# Patient Record
Sex: MALE | Race: OTHER | NOT HISPANIC OR LATINO | URBAN - METROPOLITAN AREA
[De-identification: names, ages, dates, MRNs, and addresses within clinical notes are randomized per-mention and may not be internally consistent; named-entity substitution may affect disease eponyms.]

---

## 2017-02-03 ENCOUNTER — FOLLOW UP (OUTPATIENT)
Dept: URBAN - METROPOLITAN AREA CLINIC 35 | Facility: CLINIC | Age: 47
End: 2017-02-03

## 2017-02-03 DIAGNOSIS — H35.713: ICD-10-CM

## 2017-02-03 DIAGNOSIS — H35.3210: ICD-10-CM

## 2017-02-03 PROCEDURE — 67028 INJECTION EYE DRUG: CPT

## 2017-02-03 PROCEDURE — 92012 INTRM OPH EXAM EST PATIENT: CPT | Mod: 25

## 2017-02-03 PROCEDURE — 92134 CPTRZ OPH DX IMG PST SGM RTA: CPT

## 2017-02-03 ASSESSMENT — VISUAL ACUITY
OD_CC: 20/40-1
OS_CC: 20/50+2

## 2017-02-03 ASSESSMENT — TONOMETRY
OS_IOP_MMHG: 17
OD_IOP_MMHG: 12

## 2017-04-07 ENCOUNTER — FOLLOW UP (OUTPATIENT)
Dept: URBAN - METROPOLITAN AREA CLINIC 35 | Facility: CLINIC | Age: 47
End: 2017-04-07

## 2017-04-07 DIAGNOSIS — H35.713: ICD-10-CM

## 2017-04-07 DIAGNOSIS — H35.3210: ICD-10-CM

## 2017-04-07 PROCEDURE — 67028 INJECTION EYE DRUG: CPT

## 2017-04-07 PROCEDURE — 92134 CPTRZ OPH DX IMG PST SGM RTA: CPT

## 2017-04-07 PROCEDURE — 92012 INTRM OPH EXAM EST PATIENT: CPT | Mod: 25

## 2017-04-07 ASSESSMENT — VISUAL ACUITY
OD_CC: 20/40-2
OS_CC: 20/40-2

## 2017-04-07 ASSESSMENT — TONOMETRY
OD_IOP_MMHG: 9,10
OS_IOP_MMHG: 10

## 2017-05-12 ENCOUNTER — FOLLOW UP (OUTPATIENT)
Dept: URBAN - METROPOLITAN AREA CLINIC 35 | Facility: CLINIC | Age: 47
End: 2017-05-12

## 2017-05-12 DIAGNOSIS — H35.713: ICD-10-CM

## 2017-05-12 DIAGNOSIS — H35.3210: ICD-10-CM

## 2017-05-12 PROCEDURE — 4040F PNEUMOC VAC/ADMIN/RCVD: CPT | Mod: 8P

## 2017-05-12 PROCEDURE — G8428 CUR MEDS NOT DOCUMENT: HCPCS

## 2017-05-12 PROCEDURE — 4177F TALK PT/CRGVR RE AREDS PREV: CPT

## 2017-05-12 PROCEDURE — 92014 COMPRE OPH EXAM EST PT 1/>: CPT | Mod: 25

## 2017-05-12 PROCEDURE — 1036F TOBACCO NON-USER: CPT

## 2017-05-12 PROCEDURE — G8482 FLU IMMUNIZE ORDER/ADMIN: HCPCS

## 2017-05-12 PROCEDURE — 92226 OPHTHALMOSCOPY (SUB): CPT

## 2017-05-12 PROCEDURE — 2019F DILATED MACUL EXAM DONE: CPT

## 2017-05-12 PROCEDURE — 67028 INJECTION EYE DRUG: CPT

## 2017-05-12 PROCEDURE — 92134 CPTRZ OPH DX IMG PST SGM RTA: CPT

## 2017-05-12 ASSESSMENT — VISUAL ACUITY
OD_CC: 20/50
OS_CC: 20/40-2

## 2017-05-12 ASSESSMENT — TONOMETRY
OD_IOP_MMHG: 14
OS_IOP_MMHG: 17

## 2017-07-20 ENCOUNTER — FOLLOW UP (OUTPATIENT)
Dept: URBAN - METROPOLITAN AREA CLINIC 11 | Facility: CLINIC | Age: 47
End: 2017-07-20

## 2017-07-20 DIAGNOSIS — H35.3210: ICD-10-CM

## 2017-07-20 DIAGNOSIS — H35.713: ICD-10-CM

## 2017-07-20 PROCEDURE — 67028 INJECTION EYE DRUG: CPT

## 2017-07-20 PROCEDURE — 92134 CPTRZ OPH DX IMG PST SGM RTA: CPT

## 2017-07-20 PROCEDURE — 92012 INTRM OPH EXAM EST PATIENT: CPT | Mod: 25

## 2017-07-20 ASSESSMENT — VISUAL ACUITY
OD_CC: 20/50+2
OS_CC: 20/30-2

## 2017-07-20 ASSESSMENT — TONOMETRY
OS_IOP_MMHG: 13
OD_IOP_MMHG: 12

## 2017-09-15 ENCOUNTER — FOLLOW UP (OUTPATIENT)
Dept: URBAN - METROPOLITAN AREA CLINIC 35 | Facility: CLINIC | Age: 47
End: 2017-09-15

## 2017-09-15 DIAGNOSIS — H35.3210: ICD-10-CM

## 2017-09-15 DIAGNOSIS — H35.713: ICD-10-CM

## 2017-09-15 PROCEDURE — 67028 INJECTION EYE DRUG: CPT

## 2017-09-15 PROCEDURE — 92012 INTRM OPH EXAM EST PATIENT: CPT | Mod: 25

## 2017-09-15 PROCEDURE — 92134 CPTRZ OPH DX IMG PST SGM RTA: CPT

## 2017-09-15 ASSESSMENT — TONOMETRY
OD_IOP_MMHG: 15
OS_IOP_MMHG: 15

## 2017-09-15 ASSESSMENT — VISUAL ACUITY
OS_CC: 20/50
OD_CC: 20/50+2

## 2017-11-17 ENCOUNTER — FOLLOW UP (OUTPATIENT)
Dept: URBAN - METROPOLITAN AREA CLINIC 35 | Facility: CLINIC | Age: 47
End: 2017-11-17

## 2017-11-17 DIAGNOSIS — H35.3210: ICD-10-CM

## 2017-11-17 DIAGNOSIS — H35.713: ICD-10-CM

## 2017-11-17 PROCEDURE — 92012 INTRM OPH EXAM EST PATIENT: CPT | Mod: 25

## 2017-11-17 PROCEDURE — 67028 INJECTION EYE DRUG: CPT

## 2017-11-17 PROCEDURE — 92134 CPTRZ OPH DX IMG PST SGM RTA: CPT

## 2017-11-17 ASSESSMENT — VISUAL ACUITY
OS_CC: 20/50-1
OD_CC: 20/50+1

## 2017-11-17 ASSESSMENT — TONOMETRY
OD_IOP_MMHG: 16
OS_IOP_MMHG: 16

## 2018-01-19 ENCOUNTER — FOLLOW UP (OUTPATIENT)
Dept: URBAN - METROPOLITAN AREA CLINIC 35 | Facility: CLINIC | Age: 48
End: 2018-01-19

## 2018-01-19 DIAGNOSIS — H35.3210: ICD-10-CM

## 2018-01-19 DIAGNOSIS — H35.713: ICD-10-CM

## 2018-01-19 PROCEDURE — 92134 CPTRZ OPH DX IMG PST SGM RTA: CPT

## 2018-01-19 PROCEDURE — 92012 INTRM OPH EXAM EST PATIENT: CPT

## 2018-01-19 ASSESSMENT — VISUAL ACUITY
OD_CC: 20/50
OS_CC: 20/50+1

## 2018-01-19 ASSESSMENT — TONOMETRY
OS_IOP_MMHG: 16
OD_IOP_MMHG: 13

## 2018-03-30 ENCOUNTER — FOLLOW UP (OUTPATIENT)
Dept: URBAN - METROPOLITAN AREA CLINIC 35 | Facility: CLINIC | Age: 48
End: 2018-03-30

## 2018-03-30 DIAGNOSIS — H35.713: ICD-10-CM

## 2018-03-30 DIAGNOSIS — H35.3210: ICD-10-CM

## 2018-03-30 DIAGNOSIS — H35.712: ICD-10-CM

## 2018-03-30 PROCEDURE — 92226 OPHTHALMOSCOPY (SUB): CPT

## 2018-03-30 PROCEDURE — 92134 CPTRZ OPH DX IMG PST SGM RTA: CPT

## 2018-03-30 PROCEDURE — 92014 COMPRE OPH EXAM EST PT 1/>: CPT

## 2018-03-30 ASSESSMENT — TONOMETRY
OD_IOP_MMHG: 14
OS_IOP_MMHG: 14

## 2018-03-30 ASSESSMENT — VISUAL ACUITY
OD_CC: 20/40-1
OS_CC: 20/40-1

## 2018-05-18 ENCOUNTER — FOLLOW UP (OUTPATIENT)
Dept: URBAN - METROPOLITAN AREA CLINIC 35 | Facility: CLINIC | Age: 48
End: 2018-05-18

## 2018-05-18 DIAGNOSIS — H35.712: ICD-10-CM

## 2018-05-18 DIAGNOSIS — H35.3210: ICD-10-CM

## 2018-05-18 DIAGNOSIS — H35.713: ICD-10-CM

## 2018-05-18 PROCEDURE — 92134 CPTRZ OPH DX IMG PST SGM RTA: CPT

## 2018-05-18 PROCEDURE — 92012 INTRM OPH EXAM EST PATIENT: CPT

## 2018-05-18 ASSESSMENT — TONOMETRY
OD_IOP_MMHG: 14
OS_IOP_MMHG: 16

## 2018-05-18 ASSESSMENT — VISUAL ACUITY
OS_CC: 20/70
OD_CC: 20/40-2

## 2018-08-03 ENCOUNTER — FOLLOW UP (OUTPATIENT)
Dept: URBAN - METROPOLITAN AREA CLINIC 35 | Facility: CLINIC | Age: 48
End: 2018-08-03

## 2018-08-03 DIAGNOSIS — H35.713: ICD-10-CM

## 2018-08-03 DIAGNOSIS — H35.3210: ICD-10-CM

## 2018-08-03 DIAGNOSIS — H35.712: ICD-10-CM

## 2018-08-03 PROCEDURE — 92012 INTRM OPH EXAM EST PATIENT: CPT

## 2018-08-03 PROCEDURE — 92134 CPTRZ OPH DX IMG PST SGM RTA: CPT

## 2018-08-03 ASSESSMENT — VISUAL ACUITY
OS_CC: 20/40
OD_CC: 20/40

## 2018-08-03 ASSESSMENT — TONOMETRY
OD_IOP_MMHG: 13
OS_IOP_MMHG: 13

## 2018-11-02 ENCOUNTER — FOLLOW UP (OUTPATIENT)
Dept: URBAN - METROPOLITAN AREA CLINIC 35 | Facility: CLINIC | Age: 48
End: 2018-11-02

## 2018-11-02 DIAGNOSIS — H35.3210: ICD-10-CM

## 2018-11-02 DIAGNOSIS — H35.712: ICD-10-CM

## 2018-11-02 DIAGNOSIS — H35.713: ICD-10-CM

## 2018-11-02 PROCEDURE — 92226 OPHTHALMOSCOPY (SUB): CPT

## 2018-11-02 PROCEDURE — 92014 COMPRE OPH EXAM EST PT 1/>: CPT

## 2018-11-02 PROCEDURE — 92134 CPTRZ OPH DX IMG PST SGM RTA: CPT

## 2018-11-02 ASSESSMENT — VISUAL ACUITY
OS_CC: 20/50
OD_CC: 20/40

## 2018-11-02 ASSESSMENT — TONOMETRY
OD_IOP_MMHG: 12
OS_IOP_MMHG: 18

## 2019-03-01 ENCOUNTER — FOLLOW UP (OUTPATIENT)
Dept: URBAN - METROPOLITAN AREA CLINIC 35 | Facility: CLINIC | Age: 49
End: 2019-03-01

## 2019-03-01 DIAGNOSIS — H35.3210: ICD-10-CM

## 2019-03-01 DIAGNOSIS — H35.713: ICD-10-CM

## 2019-03-01 DIAGNOSIS — H35.712: ICD-10-CM

## 2019-03-01 PROCEDURE — 92014 COMPRE OPH EXAM EST PT 1/>: CPT

## 2019-03-01 PROCEDURE — 92134 CPTRZ OPH DX IMG PST SGM RTA: CPT

## 2019-03-01 PROCEDURE — 92226 OPHTHALMOSCOPY (SUB): CPT

## 2019-03-01 ASSESSMENT — TONOMETRY
OS_IOP_MMHG: 13
OD_IOP_MMHG: 11

## 2019-03-01 ASSESSMENT — VISUAL ACUITY
OS_CC: 20/50
OD_CC: 20/50+1

## 2019-04-12 ENCOUNTER — FOLLOW UP (OUTPATIENT)
Dept: URBAN - METROPOLITAN AREA CLINIC 35 | Facility: CLINIC | Age: 49
End: 2019-04-12

## 2019-04-12 DIAGNOSIS — H35.713: ICD-10-CM

## 2019-04-12 DIAGNOSIS — H35.712: ICD-10-CM

## 2019-04-12 DIAGNOSIS — H35.3230: ICD-10-CM

## 2019-04-12 PROCEDURE — 67028 INJECTION EYE DRUG: CPT

## 2019-04-12 PROCEDURE — 92012 INTRM OPH EXAM EST PATIENT: CPT | Mod: 25

## 2019-04-12 PROCEDURE — 92250 FUNDUS PHOTOGRAPHY W/I&R: CPT

## 2019-04-12 PROCEDURE — 92235 FLUORESCEIN ANGRPH MLTIFRAME: CPT

## 2019-04-12 PROCEDURE — 92134 CPTRZ OPH DX IMG PST SGM RTA: CPT

## 2019-04-12 ASSESSMENT — TONOMETRY
OS_IOP_MMHG: 19
OD_IOP_MMHG: 15

## 2019-04-12 ASSESSMENT — VISUAL ACUITY
OS_CC: 20/60
OD_CC: 20/60

## 2019-05-24 ENCOUNTER — FOLLOW UP (OUTPATIENT)
Dept: URBAN - METROPOLITAN AREA CLINIC 35 | Facility: CLINIC | Age: 49
End: 2019-05-24

## 2019-05-24 DIAGNOSIS — H35.713: ICD-10-CM

## 2019-05-24 DIAGNOSIS — H35.3230: ICD-10-CM

## 2019-05-24 DIAGNOSIS — H35.712: ICD-10-CM

## 2019-05-24 PROCEDURE — 92226 OPHTHALMOSCOPY (SUB): CPT

## 2019-05-24 PROCEDURE — 92012 INTRM OPH EXAM EST PATIENT: CPT

## 2019-05-24 PROCEDURE — 92134 CPTRZ OPH DX IMG PST SGM RTA: CPT

## 2019-05-24 ASSESSMENT — VISUAL ACUITY
OS_CC: 20/50+1
OD_CC: 20/50+1

## 2019-05-24 ASSESSMENT — TONOMETRY
OD_IOP_MMHG: 12
OS_IOP_MMHG: 13

## 2019-07-12 ENCOUNTER — FOLLOW UP (OUTPATIENT)
Dept: URBAN - METROPOLITAN AREA CLINIC 35 | Facility: CLINIC | Age: 49
End: 2019-07-12

## 2019-07-12 DIAGNOSIS — H35.712: ICD-10-CM

## 2019-07-12 DIAGNOSIS — H35.713: ICD-10-CM

## 2019-07-12 DIAGNOSIS — H35.3230: ICD-10-CM

## 2019-07-12 PROCEDURE — 92014 COMPRE OPH EXAM EST PT 1/>: CPT

## 2019-07-12 PROCEDURE — 92134 CPTRZ OPH DX IMG PST SGM RTA: CPT

## 2019-07-12 PROCEDURE — 92226 OPHTHALMOSCOPY (SUB): CPT

## 2019-07-12 ASSESSMENT — VISUAL ACUITY
OS_CC: 20/60+1
OD_CC: 20/50

## 2019-07-12 ASSESSMENT — TONOMETRY
OS_IOP_MMHG: 14
OD_IOP_MMHG: 15

## 2019-09-20 ENCOUNTER — FOLLOW UP (OUTPATIENT)
Dept: URBAN - METROPOLITAN AREA CLINIC 35 | Facility: CLINIC | Age: 49
End: 2019-09-20

## 2019-09-20 DIAGNOSIS — H35.712: ICD-10-CM

## 2019-09-20 DIAGNOSIS — H35.713: ICD-10-CM

## 2019-09-20 DIAGNOSIS — H35.3230: ICD-10-CM

## 2019-09-20 PROCEDURE — 92134 CPTRZ OPH DX IMG PST SGM RTA: CPT

## 2019-09-20 PROCEDURE — 92226 OPHTHALMOSCOPY (SUB): CPT

## 2019-09-20 PROCEDURE — 92014 COMPRE OPH EXAM EST PT 1/>: CPT

## 2019-09-20 ASSESSMENT — VISUAL ACUITY
OS_CC: 20/50+1
OD_CC: 20/60-1

## 2019-09-20 ASSESSMENT — TONOMETRY
OS_IOP_MMHG: 17
OD_IOP_MMHG: 16

## 2019-12-20 ENCOUNTER — FOLLOW UP (OUTPATIENT)
Dept: URBAN - METROPOLITAN AREA CLINIC 35 | Facility: CLINIC | Age: 49
End: 2019-12-20

## 2019-12-20 DIAGNOSIS — H35.3230: ICD-10-CM

## 2019-12-20 DIAGNOSIS — H35.712: ICD-10-CM

## 2019-12-20 DIAGNOSIS — H35.713: ICD-10-CM

## 2019-12-20 PROCEDURE — 92226 OPHTHALMOSCOPY (SUB): CPT

## 2019-12-20 PROCEDURE — 92134 CPTRZ OPH DX IMG PST SGM RTA: CPT

## 2019-12-20 PROCEDURE — 92014 COMPRE OPH EXAM EST PT 1/>: CPT

## 2019-12-20 ASSESSMENT — TONOMETRY
OD_IOP_MMHG: 12
OS_IOP_MMHG: 15

## 2019-12-20 ASSESSMENT — VISUAL ACUITY
OD_CC: 20/50
OS_CC: 20/50+1

## 2020-06-26 ENCOUNTER — FOLLOW UP (OUTPATIENT)
Dept: URBAN - METROPOLITAN AREA CLINIC 35 | Facility: CLINIC | Age: 50
End: 2020-06-26

## 2020-06-26 DIAGNOSIS — H35.712: ICD-10-CM

## 2020-06-26 DIAGNOSIS — H35.713: ICD-10-CM

## 2020-06-26 DIAGNOSIS — H35.3230: ICD-10-CM

## 2020-06-26 PROCEDURE — 92202 OPSCPY EXTND ON/MAC DRAW: CPT

## 2020-06-26 PROCEDURE — 92014 COMPRE OPH EXAM EST PT 1/>: CPT

## 2020-06-26 PROCEDURE — 92134 CPTRZ OPH DX IMG PST SGM RTA: CPT

## 2020-06-26 ASSESSMENT — TONOMETRY
OD_IOP_MMHG: 15
OS_IOP_MMHG: 14

## 2020-06-26 ASSESSMENT — VISUAL ACUITY
OD_CC: 20/50
OS_CC: 20/70+

## 2020-07-24 ENCOUNTER — FOLLOW UP (OUTPATIENT)
Dept: URBAN - METROPOLITAN AREA CLINIC 35 | Facility: CLINIC | Age: 50
End: 2020-07-24

## 2020-07-24 DIAGNOSIS — H35.712: ICD-10-CM

## 2020-07-24 DIAGNOSIS — H35.3230: ICD-10-CM

## 2020-07-24 DIAGNOSIS — H35.713: ICD-10-CM

## 2020-07-24 PROCEDURE — 92012 INTRM OPH EXAM EST PATIENT: CPT | Mod: 25

## 2020-07-24 PROCEDURE — P EYLEA PFS

## 2020-07-24 PROCEDURE — 67028 INJECTION EYE DRUG: CPT

## 2020-07-24 ASSESSMENT — TONOMETRY
OS_IOP_MMHG: 16
OD_IOP_MMHG: 16

## 2020-07-24 ASSESSMENT — VISUAL ACUITY
OS_CC: 20/50+1
OD_CC: 20/50

## 2020-08-28 ENCOUNTER — FOLLOW UP (OUTPATIENT)
Dept: URBAN - METROPOLITAN AREA CLINIC 35 | Facility: CLINIC | Age: 50
End: 2020-08-28

## 2020-08-28 DIAGNOSIS — H35.712: ICD-10-CM

## 2020-08-28 DIAGNOSIS — H35.3230: ICD-10-CM

## 2020-08-28 DIAGNOSIS — H35.713: ICD-10-CM

## 2020-08-28 PROCEDURE — 92012 INTRM OPH EXAM EST PATIENT: CPT

## 2020-08-28 PROCEDURE — 92202 OPSCPY EXTND ON/MAC DRAW: CPT

## 2020-08-28 PROCEDURE — 92134 CPTRZ OPH DX IMG PST SGM RTA: CPT

## 2020-08-28 ASSESSMENT — TONOMETRY
OS_IOP_MMHG: 12
OD_IOP_MMHG: 13

## 2020-08-28 ASSESSMENT — VISUAL ACUITY
OS_CC: 20/50
OD_CC: 20/50

## 2020-10-15 ENCOUNTER — FOLLOW UP (OUTPATIENT)
Dept: URBAN - METROPOLITAN AREA CLINIC 11 | Facility: CLINIC | Age: 50
End: 2020-10-15

## 2020-10-15 DIAGNOSIS — H35.712: ICD-10-CM

## 2020-10-15 DIAGNOSIS — H35.3230: ICD-10-CM

## 2020-10-15 DIAGNOSIS — H35.713: ICD-10-CM

## 2020-10-15 PROCEDURE — 92134 CPTRZ OPH DX IMG PST SGM RTA: CPT

## 2020-10-15 PROCEDURE — 92014 COMPRE OPH EXAM EST PT 1/>: CPT

## 2020-10-15 ASSESSMENT — VISUAL ACUITY
OS_CC: 20/60
OD_CC: 20/60+1

## 2020-10-15 ASSESSMENT — TONOMETRY
OD_IOP_MMHG: 13
OS_IOP_MMHG: 12

## 2020-12-18 ENCOUNTER — FOLLOW UP (OUTPATIENT)
Dept: URBAN - METROPOLITAN AREA CLINIC 35 | Facility: CLINIC | Age: 50
End: 2020-12-18

## 2020-12-18 DIAGNOSIS — H35.3230: ICD-10-CM

## 2020-12-18 DIAGNOSIS — H35.712: ICD-10-CM

## 2020-12-18 DIAGNOSIS — H35.713: ICD-10-CM

## 2020-12-18 PROCEDURE — 92134 CPTRZ OPH DX IMG PST SGM RTA: CPT

## 2020-12-18 PROCEDURE — P EYLEA PFS

## 2020-12-18 PROCEDURE — 67028 INJECTION EYE DRUG: CPT

## 2020-12-18 PROCEDURE — 92014 COMPRE OPH EXAM EST PT 1/>: CPT | Mod: 25

## 2020-12-18 ASSESSMENT — VISUAL ACUITY
OS_CC: 20/50
OD_CC: 20/50

## 2020-12-18 ASSESSMENT — TONOMETRY
OS_IOP_MMHG: 14
OD_IOP_MMHG: 17

## 2021-01-29 ENCOUNTER — FOLLOW UP (OUTPATIENT)
Dept: URBAN - METROPOLITAN AREA CLINIC 35 | Facility: CLINIC | Age: 51
End: 2021-01-29

## 2021-01-29 DIAGNOSIS — H35.713: ICD-10-CM

## 2021-01-29 DIAGNOSIS — H35.3230: ICD-10-CM

## 2021-01-29 DIAGNOSIS — H35.712: ICD-10-CM

## 2021-01-29 PROCEDURE — 92014 COMPRE OPH EXAM EST PT 1/>: CPT

## 2021-01-29 PROCEDURE — 92134 CPTRZ OPH DX IMG PST SGM RTA: CPT

## 2021-01-29 PROCEDURE — 92202 OPSCPY EXTND ON/MAC DRAW: CPT

## 2021-01-29 ASSESSMENT — TONOMETRY
OD_IOP_MMHG: 16
OS_IOP_MMHG: 14

## 2021-01-29 ASSESSMENT — VISUAL ACUITY
OS_CC: 20/60
OD_CC: 20/50

## 2021-03-26 ENCOUNTER — FOLLOW UP (OUTPATIENT)
Dept: URBAN - METROPOLITAN AREA CLINIC 35 | Facility: CLINIC | Age: 51
End: 2021-03-26

## 2021-03-26 DIAGNOSIS — H35.713: ICD-10-CM

## 2021-03-26 DIAGNOSIS — H35.712: ICD-10-CM

## 2021-03-26 DIAGNOSIS — H35.3230: ICD-10-CM

## 2021-03-26 PROCEDURE — 92014 COMPRE OPH EXAM EST PT 1/>: CPT

## 2021-03-26 PROCEDURE — 92202 OPSCPY EXTND ON/MAC DRAW: CPT

## 2021-03-26 PROCEDURE — 92134 CPTRZ OPH DX IMG PST SGM RTA: CPT

## 2021-03-26 ASSESSMENT — TONOMETRY
OS_IOP_MMHG: 17
OD_IOP_MMHG: 17

## 2021-03-26 ASSESSMENT — VISUAL ACUITY
OS_CC: 20/50-1
OD_CC: 20/50+2

## 2021-05-28 ENCOUNTER — FOLLOW UP (OUTPATIENT)
Dept: URBAN - METROPOLITAN AREA CLINIC 35 | Facility: CLINIC | Age: 51
End: 2021-05-28

## 2021-05-28 DIAGNOSIS — H35.712: ICD-10-CM

## 2021-05-28 DIAGNOSIS — H35.3230: ICD-10-CM

## 2021-05-28 DIAGNOSIS — H35.713: ICD-10-CM

## 2021-05-28 PROCEDURE — 92134 CPTRZ OPH DX IMG PST SGM RTA: CPT

## 2021-05-28 PROCEDURE — 92012 INTRM OPH EXAM EST PATIENT: CPT

## 2021-05-28 ASSESSMENT — VISUAL ACUITY
OS_CC: 20/50-2
OD_CC: 20/50-2

## 2021-05-28 ASSESSMENT — TONOMETRY
OS_IOP_MMHG: 17
OD_IOP_MMHG: 15

## 2021-07-23 ENCOUNTER — FOLLOW UP (OUTPATIENT)
Dept: URBAN - METROPOLITAN AREA CLINIC 35 | Facility: CLINIC | Age: 51
End: 2021-07-23

## 2021-07-23 DIAGNOSIS — H35.713: ICD-10-CM

## 2021-07-23 DIAGNOSIS — H35.3230: ICD-10-CM

## 2021-07-23 DIAGNOSIS — H35.712: ICD-10-CM

## 2021-07-23 PROCEDURE — 92012 INTRM OPH EXAM EST PATIENT: CPT

## 2021-07-23 PROCEDURE — 92134 CPTRZ OPH DX IMG PST SGM RTA: CPT

## 2021-07-23 ASSESSMENT — VISUAL ACUITY
OD_CC: 20/50
OS_CC: 20/60+1

## 2021-07-23 ASSESSMENT — TONOMETRY
OD_IOP_MMHG: 19
OS_IOP_MMHG: 17

## 2021-09-10 ENCOUNTER — FOLLOW UP (OUTPATIENT)
Dept: URBAN - METROPOLITAN AREA CLINIC 35 | Facility: CLINIC | Age: 51
End: 2021-09-10

## 2021-09-10 DIAGNOSIS — H35.3212: ICD-10-CM

## 2021-09-10 DIAGNOSIS — H35.713: ICD-10-CM

## 2021-09-10 DIAGNOSIS — H35.712: ICD-10-CM

## 2021-09-10 DIAGNOSIS — H35.3221: ICD-10-CM

## 2021-09-10 PROCEDURE — 92134 CPTRZ OPH DX IMG PST SGM RTA: CPT

## 2021-09-10 PROCEDURE — 92012 INTRM OPH EXAM EST PATIENT: CPT

## 2021-09-10 ASSESSMENT — TONOMETRY
OS_IOP_MMHG: 16
OD_IOP_MMHG: 17

## 2021-09-10 ASSESSMENT — VISUAL ACUITY
OS_CC: 20/50
OD_CC: 20/40

## 2021-10-29 ENCOUNTER — FOLLOW UP (OUTPATIENT)
Dept: URBAN - METROPOLITAN AREA CLINIC 35 | Facility: CLINIC | Age: 51
End: 2021-10-29

## 2021-10-29 DIAGNOSIS — H35.713: ICD-10-CM

## 2021-10-29 DIAGNOSIS — H35.3212: ICD-10-CM

## 2021-10-29 DIAGNOSIS — H35.712: ICD-10-CM

## 2021-10-29 DIAGNOSIS — H35.3221: ICD-10-CM

## 2021-10-29 PROCEDURE — 92012 INTRM OPH EXAM EST PATIENT: CPT

## 2021-10-29 PROCEDURE — 92134 CPTRZ OPH DX IMG PST SGM RTA: CPT

## 2021-10-29 ASSESSMENT — VISUAL ACUITY
OD_CC: 20/40
OS_CC: 20/40

## 2021-10-29 ASSESSMENT — TONOMETRY
OD_IOP_MMHG: 19
OS_IOP_MMHG: 19

## 2022-01-28 ENCOUNTER — FOLLOW UP (OUTPATIENT)
Dept: URBAN - METROPOLITAN AREA CLINIC 35 | Facility: CLINIC | Age: 52
End: 2022-01-28

## 2022-01-28 VITALS — WEIGHT: 146 LBS | HEIGHT: 65 IN | BODY MASS INDEX: 24.32 KG/M2

## 2022-01-28 DIAGNOSIS — H35.3221: ICD-10-CM

## 2022-01-28 DIAGNOSIS — H35.712: ICD-10-CM

## 2022-01-28 DIAGNOSIS — H35.3212: ICD-10-CM

## 2022-01-28 DIAGNOSIS — H35.713: ICD-10-CM

## 2022-01-28 PROCEDURE — 92014 COMPRE OPH EXAM EST PT 1/>: CPT

## 2022-01-28 PROCEDURE — 92202 OPSCPY EXTND ON/MAC DRAW: CPT

## 2022-01-28 PROCEDURE — 92134 CPTRZ OPH DX IMG PST SGM RTA: CPT

## 2022-01-28 ASSESSMENT — TONOMETRY
OS_IOP_MMHG: 15
OD_IOP_MMHG: 15

## 2022-01-28 ASSESSMENT — VISUAL ACUITY
OD_CC: 20/40-1
OS_CC: 20/40

## 2022-04-22 ENCOUNTER — 3 MONTH EXAM (OUTPATIENT)
Dept: URBAN - METROPOLITAN AREA CLINIC 35 | Facility: CLINIC | Age: 52
End: 2022-04-22

## 2022-04-22 DIAGNOSIS — H35.713: ICD-10-CM

## 2022-04-22 DIAGNOSIS — H35.712: ICD-10-CM

## 2022-04-22 DIAGNOSIS — H35.3231: ICD-10-CM

## 2022-04-22 PROCEDURE — 92014 COMPRE OPH EXAM EST PT 1/>: CPT

## 2022-04-22 PROCEDURE — 92134 CPTRZ OPH DX IMG PST SGM RTA: CPT

## 2022-04-22 PROCEDURE — 92202 OPSCPY EXTND ON/MAC DRAW: CPT

## 2022-04-22 ASSESSMENT — VISUAL ACUITY
OS_CC: 20/50+1
OD_CC: 20/50+1

## 2022-04-22 ASSESSMENT — TONOMETRY
OD_IOP_MMHG: 15
OS_IOP_MMHG: 15

## 2022-05-06 ENCOUNTER — UNSCHEDULED FOLLOW UP (OUTPATIENT)
Dept: URBAN - METROPOLITAN AREA CLINIC 35 | Facility: CLINIC | Age: 52
End: 2022-05-06

## 2022-05-06 DIAGNOSIS — H35.3231: ICD-10-CM

## 2022-05-06 DIAGNOSIS — H35.712: ICD-10-CM

## 2022-05-06 DIAGNOSIS — H35.713: ICD-10-CM

## 2022-05-06 PROCEDURE — 92012 INTRM OPH EXAM EST PATIENT: CPT

## 2022-05-06 PROCEDURE — 92134 CPTRZ OPH DX IMG PST SGM RTA: CPT

## 2022-05-06 ASSESSMENT — VISUAL ACUITY: OS_CC: 20/40-2

## 2022-05-06 ASSESSMENT — TONOMETRY: OS_IOP_MMHG: 17

## 2022-08-05 ENCOUNTER — FOLLOW UP (OUTPATIENT)
Dept: URBAN - METROPOLITAN AREA CLINIC 35 | Facility: CLINIC | Age: 52
End: 2022-08-05

## 2022-08-05 DIAGNOSIS — H35.712: ICD-10-CM

## 2022-08-05 DIAGNOSIS — H35.3231: ICD-10-CM

## 2022-08-05 DIAGNOSIS — H35.713: ICD-10-CM

## 2022-08-05 PROCEDURE — 92134 CPTRZ OPH DX IMG PST SGM RTA: CPT

## 2022-08-05 PROCEDURE — 92014 COMPRE OPH EXAM EST PT 1/>: CPT | Mod: 25

## 2022-08-05 PROCEDURE — P EYLEA PFS

## 2022-08-05 PROCEDURE — 67028 INJECTION EYE DRUG: CPT

## 2022-08-05 ASSESSMENT — VISUAL ACUITY
OD_CC: 20/50+1
OS_CC: 20/70-1

## 2022-08-05 ASSESSMENT — TONOMETRY
OD_IOP_MMHG: 20
OS_IOP_MMHG: 11

## 2022-09-23 ENCOUNTER — FOLLOW UP (OUTPATIENT)
Dept: URBAN - METROPOLITAN AREA CLINIC 35 | Facility: CLINIC | Age: 52
End: 2022-09-23

## 2022-09-23 DIAGNOSIS — H35.713: ICD-10-CM

## 2022-09-23 DIAGNOSIS — H35.712: ICD-10-CM

## 2022-09-23 DIAGNOSIS — H35.3231: ICD-10-CM

## 2022-09-23 PROCEDURE — P EYLEA PFS

## 2022-09-23 PROCEDURE — 67028 INJECTION EYE DRUG: CPT

## 2022-09-23 PROCEDURE — 92134 CPTRZ OPH DX IMG PST SGM RTA: CPT

## 2022-09-23 PROCEDURE — 92012 INTRM OPH EXAM EST PATIENT: CPT | Mod: 25

## 2022-09-23 ASSESSMENT — VISUAL ACUITY
OS_CC: 20/200+1
OD_CC: 20/40-2

## 2022-09-23 ASSESSMENT — TONOMETRY
OD_IOP_MMHG: 14
OS_IOP_MMHG: 15

## 2022-10-28 ENCOUNTER — FOLLOW UP (OUTPATIENT)
Dept: URBAN - METROPOLITAN AREA CLINIC 35 | Facility: CLINIC | Age: 52
End: 2022-10-28

## 2022-10-28 DIAGNOSIS — H35.712: ICD-10-CM

## 2022-10-28 DIAGNOSIS — H35.713: ICD-10-CM

## 2022-10-28 DIAGNOSIS — H35.3231: ICD-10-CM

## 2022-10-28 PROCEDURE — 92250 FUNDUS PHOTOGRAPHY W/I&R: CPT

## 2022-10-28 PROCEDURE — 92202 OPSCPY EXTND ON/MAC DRAW: CPT

## 2022-10-28 PROCEDURE — 92134 CPTRZ OPH DX IMG PST SGM RTA: CPT

## 2022-10-28 PROCEDURE — 92012 INTRM OPH EXAM EST PATIENT: CPT

## 2022-10-28 PROCEDURE — 92235 FLUORESCEIN ANGRPH MLTIFRAME: CPT

## 2022-10-28 ASSESSMENT — TONOMETRY
OD_IOP_MMHG: 16
OS_IOP_MMHG: 15

## 2022-10-28 ASSESSMENT — VISUAL ACUITY
OD_CC: 20/40-1
OS_CC: 20/80

## 2022-12-16 ENCOUNTER — FOLLOW UP (OUTPATIENT)
Dept: URBAN - METROPOLITAN AREA CLINIC 35 | Facility: CLINIC | Age: 52
End: 2022-12-16

## 2022-12-16 DIAGNOSIS — H35.713: ICD-10-CM

## 2022-12-16 DIAGNOSIS — H35.712: ICD-10-CM

## 2022-12-16 DIAGNOSIS — H35.3231: ICD-10-CM

## 2022-12-16 PROCEDURE — 92134 CPTRZ OPH DX IMG PST SGM RTA: CPT

## 2022-12-16 PROCEDURE — P EYLEA PFS

## 2022-12-16 PROCEDURE — 67028 INJECTION EYE DRUG: CPT

## 2022-12-16 PROCEDURE — 92014 COMPRE OPH EXAM EST PT 1/>: CPT | Mod: 25

## 2022-12-16 ASSESSMENT — VISUAL ACUITY
OD_CC: 20/50
OD_PH: 20/40-1
OS_CC: 20/70-1

## 2022-12-16 ASSESSMENT — TONOMETRY
OD_IOP_MMHG: 17
OS_IOP_MMHG: 17

## 2023-02-17 ENCOUNTER — FOLLOW UP (OUTPATIENT)
Dept: URBAN - METROPOLITAN AREA CLINIC 35 | Facility: CLINIC | Age: 53
End: 2023-02-17

## 2023-02-17 DIAGNOSIS — H35.712: ICD-10-CM

## 2023-02-17 DIAGNOSIS — H35.3231: ICD-10-CM

## 2023-02-17 DIAGNOSIS — H35.713: ICD-10-CM

## 2023-02-17 PROCEDURE — 92014 COMPRE OPH EXAM EST PT 1/>: CPT | Mod: 25

## 2023-02-17 PROCEDURE — 67028 INJECTION EYE DRUG: CPT

## 2023-02-17 PROCEDURE — 92134 CPTRZ OPH DX IMG PST SGM RTA: CPT

## 2023-02-17 PROCEDURE — EYLSP EYLEA SAMPLE

## 2023-02-17 ASSESSMENT — VISUAL ACUITY
OD_CC: 20/40-2
OS_CC: 20/60+1

## 2023-02-17 ASSESSMENT — TONOMETRY
OS_IOP_MMHG: 13
OD_IOP_MMHG: 11

## 2023-03-31 ENCOUNTER — FOLLOW UP (OUTPATIENT)
Dept: URBAN - METROPOLITAN AREA CLINIC 35 | Facility: CLINIC | Age: 53
End: 2023-03-31

## 2023-03-31 DIAGNOSIS — H35.713: ICD-10-CM

## 2023-03-31 DIAGNOSIS — H35.712: ICD-10-CM

## 2023-03-31 DIAGNOSIS — H35.3231: ICD-10-CM

## 2023-03-31 PROCEDURE — 92134 CPTRZ OPH DX IMG PST SGM RTA: CPT

## 2023-03-31 PROCEDURE — 92014 COMPRE OPH EXAM EST PT 1/>: CPT

## 2023-03-31 ASSESSMENT — TONOMETRY
OS_IOP_MMHG: 20
OD_IOP_MMHG: 16

## 2023-03-31 ASSESSMENT — VISUAL ACUITY
OD_CC: 20/40-2
OS_CC: 20/80-1

## 2023-05-19 ENCOUNTER — FOLLOW UP (OUTPATIENT)
Dept: URBAN - METROPOLITAN AREA CLINIC 35 | Facility: CLINIC | Age: 53
End: 2023-05-19

## 2023-05-19 DIAGNOSIS — H35.713: ICD-10-CM

## 2023-05-19 DIAGNOSIS — H35.3231: ICD-10-CM

## 2023-05-19 DIAGNOSIS — H35.712: ICD-10-CM

## 2023-05-19 PROCEDURE — P EYLEA PFS

## 2023-05-19 PROCEDURE — 67028 INJECTION EYE DRUG: CPT

## 2023-05-19 PROCEDURE — 92134 CPTRZ OPH DX IMG PST SGM RTA: CPT

## 2023-05-19 PROCEDURE — 92014 COMPRE OPH EXAM EST PT 1/>: CPT | Mod: 25

## 2023-05-19 ASSESSMENT — TONOMETRY
OS_IOP_MMHG: 15
OD_IOP_MMHG: 13

## 2023-05-19 ASSESSMENT — VISUAL ACUITY
OD_CC: 20/60
OS_CC: 20/100-2

## 2023-06-23 ENCOUNTER — FOLLOW UP (OUTPATIENT)
Dept: URBAN - METROPOLITAN AREA CLINIC 35 | Facility: CLINIC | Age: 53
End: 2023-06-23

## 2023-06-23 DIAGNOSIS — H35.3231: ICD-10-CM

## 2023-06-23 DIAGNOSIS — H35.712: ICD-10-CM

## 2023-06-23 DIAGNOSIS — H35.713: ICD-10-CM

## 2023-06-23 PROCEDURE — 67028 INJECTION EYE DRUG: CPT

## 2023-06-23 PROCEDURE — 92012 INTRM OPH EXAM EST PATIENT: CPT | Mod: 25

## 2023-06-23 PROCEDURE — P EYLEA PFS

## 2023-06-23 PROCEDURE — 92134 CPTRZ OPH DX IMG PST SGM RTA: CPT

## 2023-06-23 ASSESSMENT — VISUAL ACUITY
OD_CC: 20/50
OS_CC: 20/80

## 2023-07-28 ENCOUNTER — FOLLOW UP (OUTPATIENT)
Dept: URBAN - METROPOLITAN AREA CLINIC 35 | Facility: CLINIC | Age: 53
End: 2023-07-28

## 2023-07-28 DIAGNOSIS — H35.3231: ICD-10-CM

## 2023-07-28 DIAGNOSIS — H35.712: ICD-10-CM

## 2023-07-28 DIAGNOSIS — H35.713: ICD-10-CM

## 2023-07-28 PROCEDURE — 92134 CPTRZ OPH DX IMG PST SGM RTA: CPT

## 2023-07-28 PROCEDURE — 92012 INTRM OPH EXAM EST PATIENT: CPT

## 2023-07-28 ASSESSMENT — TONOMETRY
OS_IOP_MMHG: 16
OD_IOP_MMHG: 12

## 2023-07-28 ASSESSMENT — VISUAL ACUITY
OD_CC: 20/50+1
OS_CC: 20/100-2

## 2023-09-01 ENCOUNTER — FOLLOW UP (OUTPATIENT)
Dept: URBAN - METROPOLITAN AREA CLINIC 35 | Facility: CLINIC | Age: 53
End: 2023-09-01

## 2023-09-01 DIAGNOSIS — H35.712: ICD-10-CM

## 2023-09-01 DIAGNOSIS — H35.3231: ICD-10-CM

## 2023-09-01 DIAGNOSIS — H35.713: ICD-10-CM

## 2023-09-01 PROCEDURE — 92134 CPTRZ OPH DX IMG PST SGM RTA: CPT

## 2023-09-01 PROCEDURE — 92014 COMPRE OPH EXAM EST PT 1/>: CPT

## 2023-09-01 ASSESSMENT — VISUAL ACUITY
OS_CC: 20/200
OD_CC: 20/50+1

## 2023-09-01 ASSESSMENT — TONOMETRY
OS_IOP_MMHG: 16
OD_IOP_MMHG: 14

## 2023-11-10 ENCOUNTER — FOLLOW UP (OUTPATIENT)
Dept: URBAN - METROPOLITAN AREA CLINIC 35 | Facility: CLINIC | Age: 53
End: 2023-11-10

## 2023-11-10 DIAGNOSIS — H35.713: ICD-10-CM

## 2023-11-10 DIAGNOSIS — H35.712: ICD-10-CM

## 2023-11-10 DIAGNOSIS — H35.3231: ICD-10-CM

## 2023-11-10 PROCEDURE — 92134 CPTRZ OPH DX IMG PST SGM RTA: CPT

## 2023-11-10 PROCEDURE — 92012 INTRM OPH EXAM EST PATIENT: CPT

## 2023-11-10 ASSESSMENT — TONOMETRY
OD_IOP_MMHG: 16
OS_IOP_MMHG: 22
OS_IOP_MMHG: 17
OD_IOP_MMHG: 20

## 2023-11-10 ASSESSMENT — VISUAL ACUITY
OD_CC: 20/50+1
OS_CC: 20/200

## 2024-01-05 ENCOUNTER — FOLLOW UP (OUTPATIENT)
Dept: URBAN - METROPOLITAN AREA CLINIC 35 | Facility: CLINIC | Age: 54
End: 2024-01-05

## 2024-01-05 DIAGNOSIS — H35.713: ICD-10-CM

## 2024-01-05 DIAGNOSIS — H35.712: ICD-10-CM

## 2024-01-05 DIAGNOSIS — H35.3231: ICD-10-CM

## 2024-01-05 PROCEDURE — 92014 COMPRE OPH EXAM EST PT 1/>: CPT

## 2024-01-05 PROCEDURE — 92202 OPSCPY EXTND ON/MAC DRAW: CPT

## 2024-01-05 PROCEDURE — 92134 CPTRZ OPH DX IMG PST SGM RTA: CPT

## 2024-01-05 ASSESSMENT — TONOMETRY
OS_IOP_MMHG: 16
OD_IOP_MMHG: 16

## 2024-01-05 ASSESSMENT — VISUAL ACUITY
OS_CC: 20/200
OD_CC: 20/50+1

## 2024-03-22 ENCOUNTER — FOLLOW UP (OUTPATIENT)
Dept: URBAN - METROPOLITAN AREA CLINIC 35 | Facility: CLINIC | Age: 54
End: 2024-03-22

## 2024-03-22 DIAGNOSIS — H35.712: ICD-10-CM

## 2024-03-22 DIAGNOSIS — H35.713: ICD-10-CM

## 2024-03-22 DIAGNOSIS — H35.3231: ICD-10-CM

## 2024-03-22 PROCEDURE — 67028 INJECTION EYE DRUG: CPT

## 2024-03-22 PROCEDURE — 92134 CPTRZ OPH DX IMG PST SGM RTA: CPT

## 2024-03-22 PROCEDURE — 92014 COMPRE OPH EXAM EST PT 1/>: CPT

## 2024-03-22 PROCEDURE — PFS EYLEA PFS: Mod: JZ

## 2024-03-22 ASSESSMENT — TONOMETRY
OD_IOP_MMHG: 17
OS_IOP_MMHG: 16

## 2024-03-22 ASSESSMENT — VISUAL ACUITY
OS_CC: 20/200-1
OD_CC: 20/50+1

## 2024-05-02 ENCOUNTER — OFFICE VISIT (OUTPATIENT)
Dept: NEUROLOGY | Facility: CLINIC | Age: 54
End: 2024-05-02
Payer: COMMERCIAL

## 2024-05-02 DIAGNOSIS — R51.9 HEADACHE DISORDER: Primary | ICD-10-CM

## 2024-05-02 PROBLEM — E11.9 TYPE 2 DIABETES MELLITUS, WITHOUT LONG-TERM CURRENT USE OF INSULIN (CMS/HCC): Status: ACTIVE | Noted: 2024-05-02

## 2024-05-02 PROCEDURE — 99204 OFFICE O/P NEW MOD 45 MIN: CPT | Performed by: STUDENT IN AN ORGANIZED HEALTH CARE EDUCATION/TRAINING PROGRAM

## 2024-05-02 RX ORDER — DAPAGLIFLOZIN 10 MG/1
10 TABLET, FILM COATED ORAL DAILY
COMMUNITY

## 2024-05-02 RX ORDER — METFORMIN HYDROCHLORIDE 500 MG/1
TABLET ORAL 2 TIMES DAILY WITH MEALS
COMMUNITY

## 2024-05-02 NOTE — PROGRESS NOTES
Neurology Outpatient Encounter  [facility]    Patient Name: Yan López   Patient : 1970  Patient MRN: [id number]  Date of service: 24     Summary:   Yan López is a 53 y.o. female with a past medical history notable for type 2 diabetes and retinal edema.  2023 he developed dull, mild discomfort in the left periorbital and frontal region which he describes as pulling and can be triggered by light tough or compression.  The symptoms have persisted but not been overly bothersome.  He has been evaluated by ophthalmology several times with a normal examination (follows closely with retina specialist).  An ESR was negative.  No (new) vision issues, jaw claudication, fevers, unintentional weight loss, or night sweats.  Tried low dose gabapentin but had intolerable side effects.    Assessment & Plan:  Patient presents with dull left upper head pain.      Will check MRI of the brain to rule out secondary etiology.      Low suspicion for GCA based upon age, clinical course and previously negative ESR.      Examination:   Mental status: Wide awake, alert.  Normal comprehension, attention.    Cranial nerves: Pupils equal, reactive.  Full EOM.  Face symmetric.  Speech clear.    Motor: No focal weakness.  Normal tone.  No abnormal movements.    Sensation: Intact to LT throughout.    Reflexes: 2+ biceps, triceps, patellar.    Cerebellar: No dysmetria FNF.  No truncal ataxia.    Orders Placed This Encounter   Procedures    MRI BRAIN WITHOUT CONTRAST    Sedimentation rate, automated    C-reactive protein    CBC    Comprehensive metabolic panel     No past medical history on file.    Current Outpatient Medications   Medication Sig Dispense Refill    dapagliflozin propanediol (FARXIGA) 10 mg tablet tablet Take 10 mg by mouth daily.      metFORMIN (GLUCOPHAGE) 500 mg tablet Take by mouth 2 (two) times a day with meals.       No current facility-administered medications for this visit.      Maynor Tapia MD  Neurology

## 2024-05-03 ENCOUNTER — FOLLOW UP (OUTPATIENT)
Dept: URBAN - METROPOLITAN AREA CLINIC 35 | Facility: CLINIC | Age: 54
End: 2024-05-03

## 2024-05-03 DIAGNOSIS — H35.712: ICD-10-CM

## 2024-05-03 DIAGNOSIS — H35.3231: ICD-10-CM

## 2024-05-03 DIAGNOSIS — H35.713: ICD-10-CM

## 2024-05-03 DIAGNOSIS — E11.9: ICD-10-CM

## 2024-05-03 LAB
ALBUMIN SERPL-MCNC: 4.8 G/DL (ref 3.6–5.1)
ALBUMIN/GLOB SERPL: 1.7 (CALC) (ref 1–2.5)
ALP SERPL-CCNC: 84 U/L (ref 35–144)
ALT SERPL-CCNC: 66 U/L (ref 9–46)
AST SERPL-CCNC: 41 U/L (ref 10–35)
BILIRUB SERPL-MCNC: 1 MG/DL (ref 0.2–1.2)
BUN SERPL-MCNC: 16 MG/DL (ref 7–25)
BUN/CREAT SERPL: 24 (CALC) (ref 6–22)
CALCIUM SERPL-MCNC: 10 MG/DL (ref 8.6–10.3)
CHLORIDE SERPL-SCNC: 102 MMOL/L (ref 98–110)
CO2 SERPL-SCNC: 29 MMOL/L (ref 20–32)
CREAT SERPL-MCNC: 0.68 MG/DL (ref 0.7–1.3)
CRP SERPL-MCNC: <3 MG/L
EGFRCR SERPLBLD CKD-EPI 2021: 111 ML/MIN/1.73M2
ERYTHROCYTE [DISTWIDTH] IN BLOOD BY AUTOMATED COUNT: 13.1 % (ref 11–15)
ERYTHROCYTE [SEDIMENTATION RATE] IN BLOOD BY WESTERGREN METHOD: 2 MM/H
GLOBULIN SER CALC-MCNC: 2.8 G/DL (CALC) (ref 1.9–3.7)
GLUCOSE SERPL-MCNC: 117 MG/DL (ref 65–99)
HCT VFR BLD AUTO: 48.3 % (ref 38.5–50)
HGB BLD-MCNC: 15.6 G/DL (ref 13.2–17.1)
MCH RBC QN AUTO: 28.3 PG (ref 27–33)
MCHC RBC AUTO-ENTMCNC: 32.3 G/DL (ref 32–36)
MCV RBC AUTO: 87.7 FL (ref 80–100)
PLATELET # BLD AUTO: 241 THOUSAND/UL (ref 140–400)
PMV BLD REES-ECKER: 9.9 FL (ref 7.5–12.5)
POTASSIUM SERPL-SCNC: 4.5 MMOL/L (ref 3.5–5.3)
PROT SERPL-MCNC: 7.6 G/DL (ref 6.1–8.1)
RBC # BLD AUTO: 5.51 MILLION/UL (ref 4.2–5.8)
SODIUM SERPL-SCNC: 139 MMOL/L (ref 135–146)
WBC # BLD AUTO: 5.1 THOUSAND/UL (ref 3.8–10.8)

## 2024-05-03 PROCEDURE — PFS EYLEA PFS: Mod: JZ

## 2024-05-03 PROCEDURE — 92134 CPTRZ OPH DX IMG PST SGM RTA: CPT

## 2024-05-03 PROCEDURE — 67028 INJECTION EYE DRUG: CPT

## 2024-05-03 PROCEDURE — 92202 OPSCPY EXTND ON/MAC DRAW: CPT

## 2024-05-03 PROCEDURE — 92014 COMPRE OPH EXAM EST PT 1/>: CPT

## 2024-05-03 ASSESSMENT — TONOMETRY
OD_IOP_MMHG: 11
OS_IOP_MMHG: 13

## 2024-05-03 ASSESSMENT — VISUAL ACUITY
OD_CC: 20/50
OS_CC: 20/200

## 2024-07-12 ENCOUNTER — FOLLOW UP (OUTPATIENT)
Dept: URBAN - METROPOLITAN AREA CLINIC 35 | Facility: CLINIC | Age: 54
End: 2024-07-12

## 2024-07-12 DIAGNOSIS — H35.3231: ICD-10-CM

## 2024-07-12 DIAGNOSIS — E11.9: ICD-10-CM

## 2024-07-12 PROCEDURE — 67028 INJECTION EYE DRUG: CPT

## 2024-07-12 PROCEDURE — PFS EYLEA PFS: Mod: JZ

## 2024-07-12 PROCEDURE — 92014 COMPRE OPH EXAM EST PT 1/>: CPT | Mod: 25

## 2024-07-12 PROCEDURE — 92134 CPTRZ OPH DX IMG PST SGM RTA: CPT

## 2024-07-12 PROCEDURE — 92202 OPSCPY EXTND ON/MAC DRAW: CPT | Mod: NC

## 2024-07-12 ASSESSMENT — VISUAL ACUITY
OD_CC: 20/50+1
OS_CC: 20/200-1

## 2024-07-12 ASSESSMENT — TONOMETRY
OS_IOP_MMHG: 16
OD_IOP_MMHG: 14

## 2024-09-06 ENCOUNTER — FOLLOW UP (OUTPATIENT)
Dept: URBAN - METROPOLITAN AREA CLINIC 35 | Facility: CLINIC | Age: 54
End: 2024-09-06

## 2024-09-06 DIAGNOSIS — H35.713: ICD-10-CM

## 2024-09-06 DIAGNOSIS — E11.9: ICD-10-CM

## 2024-09-06 DIAGNOSIS — H35.3231: ICD-10-CM

## 2024-09-06 PROCEDURE — 92202 OPSCPY EXTND ON/MAC DRAW: CPT

## 2024-09-06 PROCEDURE — 92134 CPTRZ OPH DX IMG PST SGM RTA: CPT

## 2024-09-06 PROCEDURE — 92014 COMPRE OPH EXAM EST PT 1/>: CPT

## 2024-09-06 ASSESSMENT — VISUAL ACUITY
OS_CC: 20/200
OD_CC: 20/50

## 2024-09-06 ASSESSMENT — TONOMETRY
OS_IOP_MMHG: 14
OD_IOP_MMHG: 13

## 2024-11-22 ENCOUNTER — FOLLOW UP (OUTPATIENT)
Dept: URBAN - METROPOLITAN AREA CLINIC 35 | Facility: CLINIC | Age: 54
End: 2024-11-22

## 2024-11-22 DIAGNOSIS — H35.713: ICD-10-CM

## 2024-11-22 DIAGNOSIS — E11.9: ICD-10-CM

## 2024-11-22 DIAGNOSIS — H35.3231: ICD-10-CM

## 2024-11-22 PROCEDURE — 92014 COMPRE OPH EXAM EST PT 1/>: CPT

## 2024-11-22 PROCEDURE — 92134 CPTRZ OPH DX IMG PST SGM RTA: CPT

## 2024-11-22 ASSESSMENT — TONOMETRY
OD_IOP_MMHG: 13
OS_IOP_MMHG: 14

## 2024-11-22 ASSESSMENT — VISUAL ACUITY
OD_CC: 20/40
OS_CC: 20/200

## 2025-02-13 ENCOUNTER — FOLLOW UP (OUTPATIENT)
Dept: URBAN - METROPOLITAN AREA CLINIC 35 | Facility: CLINIC | Age: 55
End: 2025-02-13

## 2025-02-13 DIAGNOSIS — E11.9: ICD-10-CM

## 2025-02-13 DIAGNOSIS — H35.713: ICD-10-CM

## 2025-02-13 DIAGNOSIS — H35.3231: ICD-10-CM

## 2025-02-13 PROCEDURE — 92014 COMPRE OPH EXAM EST PT 1/>: CPT

## 2025-02-13 PROCEDURE — 92202 OPSCPY EXTND ON/MAC DRAW: CPT

## 2025-02-13 PROCEDURE — 92134 CPTRZ OPH DX IMG PST SGM RTA: CPT

## 2025-02-13 ASSESSMENT — VISUAL ACUITY
OS_CC: 20/200
OD_CC: 20/40

## 2025-02-13 ASSESSMENT — TONOMETRY
OD_IOP_MMHG: 14
OS_IOP_MMHG: 12

## 2025-03-27 ENCOUNTER — FOLLOW UP (OUTPATIENT)
Dept: URBAN - METROPOLITAN AREA CLINIC 35 | Facility: CLINIC | Age: 55
End: 2025-03-27

## 2025-03-27 DIAGNOSIS — H35.3231: ICD-10-CM

## 2025-03-27 DIAGNOSIS — E11.9: ICD-10-CM

## 2025-03-27 DIAGNOSIS — H35.713: ICD-10-CM

## 2025-03-27 PROCEDURE — 92014 COMPRE OPH EXAM EST PT 1/>: CPT

## 2025-03-27 PROCEDURE — 92202 OPSCPY EXTND ON/MAC DRAW: CPT

## 2025-03-27 PROCEDURE — 92134 CPTRZ OPH DX IMG PST SGM RTA: CPT

## 2025-03-27 ASSESSMENT — TONOMETRY
OD_IOP_MMHG: 15
OS_IOP_MMHG: 16

## 2025-03-27 ASSESSMENT — VISUAL ACUITY
OD_CC: 20/50+1
OS_CC: 20/200

## 2025-06-26 ENCOUNTER — FOLLOW UP (OUTPATIENT)
Dept: URBAN - METROPOLITAN AREA CLINIC 35 | Facility: CLINIC | Age: 55
End: 2025-06-26

## 2025-06-26 DIAGNOSIS — H35.3231: ICD-10-CM

## 2025-06-26 DIAGNOSIS — E11.9: ICD-10-CM

## 2025-06-26 DIAGNOSIS — H35.713: ICD-10-CM

## 2025-06-26 PROCEDURE — 92134 CPTRZ OPH DX IMG PST SGM RTA: CPT

## 2025-06-26 PROCEDURE — 92014 COMPRE OPH EXAM EST PT 1/>: CPT

## 2025-06-26 PROCEDURE — 92202 OPSCPY EXTND ON/MAC DRAW: CPT

## 2025-06-26 ASSESSMENT — VISUAL ACUITY
OS_CC: 20/300
OD_CC: 20/50-1

## 2025-06-26 ASSESSMENT — TONOMETRY
OD_IOP_MMHG: 14
OS_IOP_MMHG: 14